# Patient Record
Sex: MALE | Employment: FULL TIME | URBAN - METROPOLITAN AREA
[De-identification: names, ages, dates, MRNs, and addresses within clinical notes are randomized per-mention and may not be internally consistent; named-entity substitution may affect disease eponyms.]

---

## 2020-12-09 ENCOUNTER — OFFICE VISIT (OUTPATIENT)
Dept: FAMILY MEDICINE | Facility: CLINIC | Age: 40
End: 2020-12-09

## 2020-12-09 VITALS
HEART RATE: 93 BPM | RESPIRATION RATE: 16 BRPM | DIASTOLIC BLOOD PRESSURE: 96 MMHG | SYSTOLIC BLOOD PRESSURE: 136 MMHG | OXYGEN SATURATION: 99 % | TEMPERATURE: 97.6 F | WEIGHT: 229 LBS | HEIGHT: 68 IN | BODY MASS INDEX: 34.71 KG/M2

## 2020-12-09 DIAGNOSIS — F17.200 TOBACCO USE DISORDER: ICD-10-CM

## 2020-12-09 DIAGNOSIS — Z11.3 SCREEN FOR STD (SEXUALLY TRANSMITTED DISEASE): Primary | ICD-10-CM

## 2020-12-09 DIAGNOSIS — G47.33 OBSTRUCTIVE SLEEP APNEA: ICD-10-CM

## 2020-12-09 DIAGNOSIS — I10 BENIGN ESSENTIAL HYPERTENSION: ICD-10-CM

## 2020-12-09 LAB
ANION GAP SERPL CALCULATED.3IONS-SCNC: 2 MMOL/L (ref 3–14)
BUN SERPL-MCNC: 12 MG/DL (ref 7–30)
CALCIUM SERPL-MCNC: 9.5 MG/DL (ref 8.5–10.1)
CHLORIDE SERPL-SCNC: 103 MMOL/L (ref 94–109)
CO2 SERPL-SCNC: 30 MMOL/L (ref 20–32)
CREAT SERPL-MCNC: 0.88 MG/DL (ref 0.66–1.25)
GFR SERPL CREATININE-BSD FRML MDRD: >90 ML/MIN/{1.73_M2}
GLUCOSE SERPL-MCNC: 99 MG/DL (ref 70–99)
HBV CORE AB SERPL QL IA: NONREACTIVE
HBV SURFACE AB SERPL IA-ACNC: 0.35 M[IU]/ML
HBV SURFACE AG SERPL QL IA: NONREACTIVE
HCV AB SERPL QL IA: NONREACTIVE
HIV 1+2 AB+HIV1 P24 AG SERPL QL IA: NONREACTIVE
POTASSIUM SERPL-SCNC: 3.8 MMOL/L (ref 3.4–5.3)
SODIUM SERPL-SCNC: 135 MMOL/L (ref 133–144)
T PALLIDUM AB SER QL: NONREACTIVE

## 2020-12-09 PROCEDURE — 80048 BASIC METABOLIC PNL TOTAL CA: CPT | Performed by: FAMILY MEDICINE

## 2020-12-09 PROCEDURE — 86704 HEP B CORE ANTIBODY TOTAL: CPT | Performed by: FAMILY MEDICINE

## 2020-12-09 PROCEDURE — 99203 OFFICE O/P NEW LOW 30 MIN: CPT | Performed by: FAMILY MEDICINE

## 2020-12-09 PROCEDURE — 86803 HEPATITIS C AB TEST: CPT | Performed by: FAMILY MEDICINE

## 2020-12-09 PROCEDURE — 87491 CHLMYD TRACH DNA AMP PROBE: CPT | Performed by: FAMILY MEDICINE

## 2020-12-09 PROCEDURE — 86696 HERPES SIMPLEX TYPE 2 TEST: CPT | Performed by: FAMILY MEDICINE

## 2020-12-09 PROCEDURE — 36415 COLL VENOUS BLD VENIPUNCTURE: CPT | Performed by: FAMILY MEDICINE

## 2020-12-09 PROCEDURE — 87340 HEPATITIS B SURFACE AG IA: CPT | Performed by: FAMILY MEDICINE

## 2020-12-09 PROCEDURE — 87389 HIV-1 AG W/HIV-1&-2 AB AG IA: CPT | Performed by: FAMILY MEDICINE

## 2020-12-09 PROCEDURE — 87591 N.GONORRHOEAE DNA AMP PROB: CPT | Performed by: FAMILY MEDICINE

## 2020-12-09 PROCEDURE — 86706 HEP B SURFACE ANTIBODY: CPT | Performed by: FAMILY MEDICINE

## 2020-12-09 PROCEDURE — 86780 TREPONEMA PALLIDUM: CPT | Mod: 90 | Performed by: FAMILY MEDICINE

## 2020-12-09 PROCEDURE — 86695 HERPES SIMPLEX TYPE 1 TEST: CPT | Mod: 59 | Performed by: FAMILY MEDICINE

## 2020-12-09 PROCEDURE — 99000 SPECIMEN HANDLING OFFICE-LAB: CPT | Performed by: FAMILY MEDICINE

## 2020-12-09 RX ORDER — CHLORTHALIDONE 25 MG/1
25 TABLET ORAL DAILY
Qty: 90 TABLET | Refills: 3 | Status: SHIPPED | OUTPATIENT
Start: 2020-12-09

## 2020-12-09 RX ORDER — LISINOPRIL AND HYDROCHLOROTHIAZIDE 20; 25 MG/1; MG/1
1 TABLET ORAL
COMMUNITY
Start: 2020-07-17 | End: 2020-12-09

## 2020-12-09 ASSESSMENT — MIFFLIN-ST. JEOR: SCORE: 1933.24

## 2020-12-09 NOTE — PATIENT INSTRUCTIONS
Blood tests today and urine test for STDS    Will check kidney function today     Start on chlorthalidone 25 mg daily     Follow up in 2 weeks for repeat BP and labs.

## 2020-12-09 NOTE — PROGRESS NOTES
Subjective     Blake Sánchez is a 40 year old male who presents to clinic today for the following health issues:    HPI         STD testing  Patient presents requesting STD testing today - states that his last partner was not faithful and he wants to be safe.   Denies any penile lesions or sores.  No penile discharge.  No pain with urination.  No abdominal pain.  He would like to be checked just to make sure everything is okay.     Hypertension Follow-up      Do you check your blood pressure regularly outside of the clinic? No - does have a cuff he recently bought but does not check regularly     Are you following a low salt diet? Yes    Are your blood pressures ever more than 140 on the top number (systolic) OR more   than 90 on the bottom number (diastolic), for example 140/90? Yes      How many servings of fruits and vegetables do you eat daily?  0-1    On average, how many sweetened beverages do you drink each day (Examples: soda, juice, sweet tea, etc.  Do NOT count diet or artificially sweetened beverages)?   1    How many days per week do you exercise enough to make your heart beat faster? 5    How many minutes a day do you exercise enough to make your heart beat faster? 60 or more states his job is physical.   How many days per week do you miss taking your medication? 1    What makes it hard for you to take your medications?  remembering to take    Drinks 1-2 cups per day of caffeine   No chest pain or shortness of breath   He states that he was recently diagnosed with obstructive sleep apnea and is also going to be getting a CPAP machine.  He reports in the past he was on chlorthalidone which helped control his blood pressure.  He is currently on lisinopril-hydrochlorothiazide 20-25 mg.  He would like to go back on chlorthalidone at this time. In past was on 25 mg daily of chlorthalidone.  -Discussed with patient that using CPAP machine can help with uncontrolled hypertension.        Review of Systems  "  Constitutional, HEENT, cardiovascular, pulmonary, gi and gu systems are negative, except as otherwise noted.      Objective    BP (!) 136/96 (BP Location: Left arm, Patient Position: Sitting, Cuff Size: Adult Large)   Pulse 93   Temp 97.6  F (36.4  C) (Tympanic)   Resp 16   Ht 1.727 m (5' 8\")   Wt 103.9 kg (229 lb)   SpO2 99%   BMI 34.82 kg/m    Body mass index is 34.82 kg/m .  Physical Exam   General: alert, cooperative, no acute distress   CV: RRR, no murmur  Resp: non-labored breathing, clear to auscultation, no wheezing or rales   Abdomen: Soft, non-tender, no guarding.   Extremities: No peripheral edema, calves non-tender.   : Testes tender bilaterally.  Circumcised penis.  No penile drainage.  No sores noted.  Testes nontender.        Assessment & Plan     Blake was seen today for hypertension and std.    Diagnoses and all orders for this visit:    Screen for STD (sexually transmitted disease)  -Asymptomatic at this time. Last partner not faithful.  Will test as below.  Discussed herpes testing is usually completed by swab of active lesion. He would like the herpes antibody test.  Discussed safe sex practices.  -     Neisseria gonorrhoeae PCR  -     Chlamydia trachomatis PCR  -     Treponema Abs w Reflex to RPR and Titer  -     HIV Antigen Antibody Combo  -     Hepatitis C Screen Reflex to HCV RNA Quant and Genotype  -     Hepatitis B core antibody  -     Hepatitis B surface antigen  -     Hepatitis B Surface Antibody  -     HSV 1 and 2 DNA by PCR    Benign essential hypertension  Per patient request we will transition back to chlorthalidone 25 mg daily.  Will stop lisinopril-hydrochlorothiazide combo pill.  Will obtain BMP today.  Patient will follow up in 2 weeks with a repeat blood pressure check and also repeat creatinine, potassium and sodium prior to visit.  Patient reports having a blood pressure cuff at home, encouraged him to check his blood pressure daily and record these values for next " follow-up appointment.  He also states that he has a CPAP machine and will begin wearing this.  -     Basic metabolic panel  (Ca, Cl, CO2, Creat, Gluc, K, Na, BUN)  -     chlorthalidone (HYGROTON) 25 MG tablet; Take 1 tablet (25 mg) by mouth daily  -     Creatinine; Future  -     Potassium; Future  -     Sodium; Future    Obstructive sleep apnea  -Recently tested.  Has DAFNE.  No CPAP at this time. Will be getting one soon.   Tobacco use disorder  -Currently smoking but will quit later this month.            Follow up in two weeks with repeat labs and office visit.     Sam Calderon,   [facility]

## 2020-12-10 ENCOUNTER — TELEPHONE (OUTPATIENT)
Dept: FAMILY MEDICINE | Facility: CLINIC | Age: 40
End: 2020-12-10

## 2020-12-10 DIAGNOSIS — Z11.3 SCREEN FOR STD (SEXUALLY TRANSMITTED DISEASE): Primary | ICD-10-CM

## 2020-12-10 LAB
C TRACH DNA SPEC QL NAA+PROBE: NEGATIVE
HSV1 DNA SPEC QL NAA+PROBE: NORMAL
HSV1 IGG SERPL QL IA: <0.2 AI (ref 0–0.8)
HSV2 DNA SPEC QL NAA+PROBE: NORMAL
HSV2 IGG SERPL QL IA: <0.2 AI (ref 0–0.8)
N GONORRHOEA DNA SPEC QL NAA+PROBE: NEGATIVE
SPECIMEN SOURCE: NORMAL

## 2020-12-10 NOTE — TELEPHONE ENCOUNTER
"Patient had OV yesterday for STD screen.  Herpes PCR test was ordered.  RN received call from lab - they believe this is the wrong order.  MD looking for previous exposure?  If so, then the pended lab is correct one - please leave as \"normal\" and \"add-on\".    RN - call Krysta () back at [phone] and she will walk sample over to correct place once the lab has been ordered.    OV notes 12-9-20:  Screen for STD (sexually transmitted disease)  -Asymptomatic at this time. Last partner not faithful.  Will test as below.  Discussed herpes testing is usually completed by swab of active lesion. He would like the herpes antibody test.  Discussed safe sex practices.  -     Neisseria gonorrhoeae PCR  -     Chlamydia trachomatis PCR  -     Treponema Abs w Reflex to RPR and Titer  -     HIV Antigen Antibody Combo  -     Hepatitis C Screen Reflex to HCV RNA Quant and Genotype  -     Hepatitis B core antibody  -     Hepatitis B surface antigen  -     Hepatitis B Surface Antibody  -     HSV 1 and 2 DNA by PCR      Routing to provider.  Hui MENDOSA, MSN, RN        "

## 2020-12-10 NOTE — RESULT ENCOUNTER NOTE
Please call patient     Most recent testing has returned satisfactory.  Testing was negative for syphilis, hepatitis, HIV, chlamydia, gonorrhea, herpes.    BMP has returned satisfactory.

## 2020-12-10 NOTE — ADDENDUM NOTE
Addended by: LUIS ENRIQUE RICO on: 12/10/2020 12:12 PM     Modules accepted: Orders     Airway patent, Nasal mucosa clear. Mouth with normal mucosa. Throat has no vesicles, no oropharyngeal exudates and uvula is midline.

## 2020-12-10 NOTE — TELEPHONE ENCOUNTER
RN not certain why you cannot sign?  RN will sign for you since your verbal is below.    Thank you!  Hui MENDOSA MSN, RN

## 2020-12-10 NOTE — TELEPHONE ENCOUNTER
I am unable to sign this lab when it is kept as normal and add on. It comes to a hard stop and I can't sign it. Can I switch it to future and sign it or what can be done?    Thanks,     Sam

## 2020-12-11 LAB
HSV1 IGG SERPL QL IA: <0.2 AI (ref 0–0.8)
HSV2 IGG SERPL QL IA: <0.2 AI (ref 0–0.8)

## 2021-01-15 ENCOUNTER — HEALTH MAINTENANCE LETTER (OUTPATIENT)
Age: 41
End: 2021-01-15

## 2021-09-25 ENCOUNTER — HEALTH MAINTENANCE LETTER (OUTPATIENT)
Age: 41
End: 2021-09-25

## 2022-03-12 ENCOUNTER — HEALTH MAINTENANCE LETTER (OUTPATIENT)
Age: 42
End: 2022-03-12

## 2023-01-07 ENCOUNTER — HEALTH MAINTENANCE LETTER (OUTPATIENT)
Age: 43
End: 2023-01-07

## 2023-04-22 ENCOUNTER — HEALTH MAINTENANCE LETTER (OUTPATIENT)
Age: 43
End: 2023-04-22